# Patient Record
Sex: MALE | Race: WHITE | Employment: FULL TIME | URBAN - METROPOLITAN AREA
[De-identification: names, ages, dates, MRNs, and addresses within clinical notes are randomized per-mention and may not be internally consistent; named-entity substitution may affect disease eponyms.]

---

## 2017-01-03 ENCOUNTER — TELEPHONE (OUTPATIENT)
Dept: UROLOGY | Facility: CLINIC | Age: 52
End: 2017-01-03

## 2017-01-03 NOTE — TELEPHONE ENCOUNTER
Spoke with patient after having catheter removed on 1/2/17 post urethroplasty surgery 12/8/16. Patient stated he is urinating well and stream is good. Patient denies any fever,chills, or body aches. Patient states incision site is a little tender. Patient stated that VCUG appointment was supposed to be on 1/9/17, but stated he refused and wanted catheter removed. Message sent to Dana Kaufman RN so she can message Dr Gonzales about situation

## 2017-01-09 ENCOUNTER — OFFICE VISIT (OUTPATIENT)
Dept: UROLOGY | Facility: CLINIC | Age: 52
End: 2017-01-09

## 2017-01-09 VITALS
HEART RATE: 80 BPM | DIASTOLIC BLOOD PRESSURE: 78 MMHG | BODY MASS INDEX: 33.77 KG/M2 | WEIGHT: 228 LBS | HEIGHT: 69 IN | SYSTOLIC BLOOD PRESSURE: 144 MMHG

## 2017-01-09 DIAGNOSIS — N99.114 POSTPROCEDURAL URETHRAL STRICTURE, MALE, UNSPECIFIED: Primary | ICD-10-CM

## 2017-01-09 ASSESSMENT — PAIN SCALES - GENERAL: PAINLEVEL: NO PAIN (0)

## 2017-01-09 NOTE — Clinical Note
"2017       RE: Neto John  [address] [place] [address]     Dear Colleague,    Thank you for referring your patient, Neto John, to the [facility] UROLOGY AND INST FOR PROSTATE AND UROLOGIC CANCERS at [facility]. Please see a copy of my visit note below.    Urethroplasty post-operative visit:    Procedure: ventral onlay bulbar urethroplasty  Date: 16    HPI:  Neto John is a 51 year old male who is 4weeks status post urethroplasty. He had VCUG 1 week ago that showed resolution of stricture with no extravasation. He emptied his bladder well during the test.    Pain is controlled  Appetite is normal  Bowel movements are normal    /78 mmHg  Pulse 80  Ht 1.753 m (5' 9\")  Wt 103.42 kg (228 lb)  BMI 33.65 kg/m2  Incision clean, dry, intact  No tenderness or evidence of cellulitis  No hematoma  Sutures are intact    Imagin2017  RUG/VCU/11/16      A/P: 51 year old male 4 weeks s/p ventral onlay bulbar urethroplasty    Excellent outcome.    We discussed that thought his catheter was removed, he is still in the healing phase of surgery. He understands he should not lift heavy weights > 10 lbs for another 2 weeks. He is to avoid straddle activities (biking, motorcycle, horse riding, tractor work etc) for another 3 months and in general should avoid these activities. He understands should he require catheterization in the future, to make the performing provider aware of his urethral reconstruction. He expressed understanding.  F/U: 3 months for urethroscopy    _________________________________________________________________  Issac Gonzales, DO  Fellow/Instructor  Department of Urology                  "

## 2017-01-09 NOTE — NURSING NOTE
Chief Complaint   Patient presents with     Surgical Followup     urethroplasty      Jessee GALINDO

## 2017-01-09 NOTE — MR AVS SNAPSHOT
After Visit Summary   1/9/2017    Neto John    MRN: [id number]           Patient Information     Date Of Birth          1965        Visit Information        Provider Department      1/9/2017 12:30 PM Issac Gonzales DO [facility] Urology and [facility] for Prostate and Urologic Cancers        Care Instructions    Follow up with Dr Murillo on 3/27/17 at 730 am for a cystoscopy. Arrive to clinic with a full bladder for urine stream test    It was a pleasure meeting with you today.  Thank you for allowing me and my team the privilege of caring for you today.  YOU are the reason we are here, and I truly hope we provided you with the excellent service you deserve.  Please let us know if there is anything else we can do for you so that we can be sure you are leaving completely satisfied with your care experience.                Follow-ups after your visit        Your next 10 appointments already scheduled     Mar 27, 2017  7:30 AM   (Arrive by 7:15 AM)   Cystoscopy with Javier Murillo MD   [facility] Urology and [facility] for Prostate and Urologic Cancers ([facility])    [address] [place] [address] [phone]              Who to contact     Please call your clinic at [phone] to:    Ask questions about your health    Make or cancel appointments    Discuss your medicines    Learn about your test results    Speak to your doctor   If you have compliments or concerns about an experience at your clinic, or if you wish to file a complaint, please contact [facility] Patient Relations at [phone] or email us at [email or web address]         Additional Information About Your Visit        MyChart Information     [employer] is an electronic gateway that provides easy, online access to your medical records. With [employer], you can request a clinic appointment, read your test results, renew a prescription or  "communicate with your care team.     To sign up for [employer] visit the website at [email or web address]   You will be asked to enter the access code listed below, as well as some personal information. Please follow the directions to create your username and password.     Your access code is: [email or web address]  Expires: 3/8/2017  8:51 PM     Your access code will  in 90 days. If you need help or a new code, please contact your [facility] or call [phone] for assistance.        Care EveryWhere ID     This is your Care EveryWhere ID. This could be used by other organizations to access your [place] medical records  [id number]        Your Vitals Were     Pulse Height BMI (Body Mass Index)             80 1.753 m (5' 9\") 33.65 kg/m2          Blood Pressure from Last 3 Encounters:   17 144/78   16 120/77   16 146/85    Weight from Last 3 Encounters:   17 103.42 kg (228 lb)   16 102.1 kg (225 lb 1.4 oz)   16 97.523 kg (215 lb)              Today, you had the following     No orders found for display       Primary Care Provider Office Phone # Fax #    Sol LemonOG milian [phone]       [facility] [address] [facility] [address]        Thank you!     Thank you for choosing [facility] UROLOGY AND [facility] FOR PROSTATE AND UROLOGIC CANCERS  for your care. Our goal is always to provide you with excellent care. Hearing back from our patients is one way we can continue to improve our services. Please take a few minutes to complete the written survey that you may receive in the mail after your visit with us. Thank you!             Your Updated Medication List - Protect others around you: Learn how to safely use, store and throw away your medicines at www.disposemymeds.org.          This list is accurate as of: 17  1:06 PM.  Always use your most recent med list.                   Brand Name Dispense Instructions for use    " bacitracin ointment     14 g    Apply topically 2 times daily       OMEPRAZOLE PO      Take 20 mg by mouth       oxybutynin 10 MG 24 hr tablet    DITROPAN XL    90 tablet    Take 1 tablet (10 mg) by mouth daily       oxyCODONE 5 MG IR tablet    ROXICODONE    30 tablet    Take 1-2 tablets (5-10 mg) by mouth every 3 hours as needed for other (Moderate to Severe Pain)       senna 8.6 MG tablet    SENOKOT    120 tablet    Take 1 tablet by mouth daily       TYLENOL PO      Take 500 mg by mouth       VITAMIN D3 PO      Take 5,000 Units by mouth daily

## 2017-01-09 NOTE — PATIENT INSTRUCTIONS
Follow up with Dr Murillo on 3/27/17 at 730 am for a cystoscopy. Arrive to clinic with a full bladder for urine stream test    It was a pleasure meeting with you today.  Thank you for allowing me and my team the privilege of caring for you today.  YOU are the reason we are here, and I truly hope we provided you with the excellent service you deserve.  Please let us know if there is anything else we can do for you so that we can be sure you are leaving completely satisfied with your care experience.

## 2017-01-09 NOTE — PROGRESS NOTES
"Urethroplasty post-operative visit:    Procedure: ventral onlay bulbar urethroplasty  Date: 16    HPI:  Neto John is a 51 year old male who is 4weeks status post urethroplasty. He had VCUG 1 week ago that showed resolution of stricture with no extravasation. He emptied his bladder well during the test.    Pain is controlled  Appetite is normal  Bowel movements are normal    /78 mmHg  Pulse 80  Ht 1.753 m (5' 9\")  Wt 103.42 kg (228 lb)  BMI 33.65 kg/m2  Incision clean, dry, intact  No tenderness or evidence of cellulitis  No hematoma  Sutures are intact    Imagin2017  RUG/VCU/11/16      A/P: 51 year old male 4 weeks s/p ventral onlay bulbar urethroplasty    Excellent outcome.    We discussed that thought his catheter was removed, he is still in the healing phase of surgery. He understands he should not lift heavy weights > 10 lbs for another 2 weeks. He is to avoid straddle activities (biking, motorcycle, horse riding, tractor work etc) for another 3 months and in general should avoid these activities. He understands should he require catheterization in the future, to make the performing provider aware of his urethral reconstruction. He expressed understanding.  F/U: 3 months for urethroscopy    _________________________________________________________________  Issac Gonzales DO  Fellow/Instructor  Department of Urology            "

## 2017-03-23 ENCOUNTER — PRE VISIT (OUTPATIENT)
Dept: UROLOGY | Facility: CLINIC | Age: 52
End: 2017-03-23

## 2017-03-23 NOTE — TELEPHONE ENCOUNTER
Patient with a history of urethroplasty coming in for a cystoscopy. Chart reviewed and all records are available. Pt called and reminded to come with a full bladder for a flow/pvr.

## 2017-03-27 ENCOUNTER — OFFICE VISIT (OUTPATIENT)
Dept: UROLOGY | Facility: CLINIC | Age: 52
End: 2017-03-27

## 2017-03-27 VITALS — WEIGHT: 229.6 LBS | HEIGHT: 69 IN | BODY MASS INDEX: 34 KG/M2

## 2017-03-27 DIAGNOSIS — N99.111 POSTPROCEDURAL BULBOUS URETHRAL STRICTURE: Primary | ICD-10-CM

## 2017-03-27 ASSESSMENT — PAIN SCALES - GENERAL
PAINLEVEL: NO PAIN (0)
PAINLEVEL: NO PAIN (0)

## 2017-03-27 NOTE — MR AVS SNAPSHOT
After Visit Summary   3/27/2017    Neto John    MRN: [id number]           Patient Information     Date Of Birth          1965        Visit Information        Provider Department      3/27/2017 7:30 AM Javier Murillo MD [facility] Urology and [facility] for Prostate and Urologic Cancers        Today's Diagnoses     Postprocedural bulbous urethral stricture    -  1      Care Instructions    Cysto, Uroflow/PVR, Questionnaires in 9 months.        Follow-ups after your visit        Follow-up notes from your care team     Return in about 9 months (around 12/27/2017) for Cysto, Uroflow/PVR, Questionnaires.      Your next 10 appointments already scheduled     Dec 04, 2017  7:30 AM CST   (Arrive by 7:15 AM)   Cystoscopy with Javier Murillo MD   [facility] Urology and [facility] for Prostate and Urologic Cancers ([facility])    [address] [place] [address] [phone]              Who to contact     Please call your clinic at [phone] to:    Ask questions about your health    Make or cancel appointments    Discuss your medicines    Learn about your test results    Speak to your doctor   If you have compliments or concerns about an experience at your clinic, or if you wish to file a complaint, please contact [facility] Patient Relations at [phone] or email us at [email or web address]         Additional Information About Your Visit        MyChart Information     [employer] is an electronic gateway that provides easy, online access to your medical records. With [employer], you can request a clinic appointment, read your test results, renew a prescription or communicate with your care team.     To sign up for [employer] visit the website at [email or web address]   You will be asked to enter the access code listed below, as well as some personal information. Please follow the directions to create your username  "and password.     Your access code is: [email or web address]  Expires: 2017  6:30 AM     Your access code will  in 90 days. If you need help or a new code, please contact your [facility] or call [phone] for assistance.        Care EveryWhere ID     This is your Care EveryWhere ID. This could be used by other organizations to access your [place] medical records  [id number]        Your Vitals Were     Height BMI (Body Mass Index)                1.753 m (5' 9\") 33.91 kg/m2           Blood Pressure from Last 3 Encounters:   17 (P) 145/82   17 144/78   16 120/77    Weight from Last 3 Encounters:   17 104.1 kg (229 lb 9.6 oz)   17 103.4 kg (228 lb)   16 102.1 kg (225 lb 1.4 oz)              We Performed the Following     COMPLEX UROFLOWMETRY     Cystoscopy ([address])     POST-VOID RESIDUAL BLADDER SCAN          Today's Medication Changes          These changes are accurate as of: 3/27/17  8:41 AM.  If you have any questions, ask your nurse or doctor.               Stop taking these medicines if you haven't already. Please contact your care team if you have questions.     bacitracin ointment   Stopped by:  Javier Murillo MD           OMEPRAZOLE PO   Stopped by:  Javier Murillo MD           oxybutynin 10 MG 24 hr tablet   Commonly known as:  DITROPAN XL   Stopped by:  Javier Murillo MD           oxyCODONE 5 MG IR tablet   Commonly known as:  ROXICODONE   Stopped by:  Javier Murillo MD           senna 8.6 MG tablet   Commonly known as:  SENOKOT   Stopped by:  Javier Murillo MD                    Primary Care Provider Office Phone # Fax #    Sol Childs [phone]       [facility] [address] [facility] [address]        Thank you!     Thank you for choosing [facility] UROLOGY AND [facility] FOR PROSTATE AND UROLOGIC CANCERS  for your care. Our goal is always to provide you with " excellent care. Hearing back from our patients is one way we can continue to improve our services. Please take a few minutes to complete the written survey that you may receive in the mail after your visit with us. Thank you!             Your Updated Medication List - Protect others around you: Learn how to safely use, store and throw away your medicines at www.disposemymeds.org.          This list is accurate as of: 3/27/17  8:41 AM.  Always use your most recent med list.                   Brand Name Dispense Instructions for use    aspirin 81 MG tablet      Take 81 mg by mouth daily       TYLENOL PO      Take 500 mg by mouth       VITAMIN D3 PO      Take 5,000 Units by mouth daily

## 2017-03-27 NOTE — NURSING NOTE
"Chief Complaint   Patient presents with     Cystoscopy     Urethroplasty follow up       Height 1.753 m (5' 9\"), weight 104.1 kg (229 lb 9.6 oz). Body mass index is 33.91 kg/(m^2).    There is no problem list on file for this patient.      No Known Allergies    Current Outpatient Prescriptions   Medication Sig Dispense Refill     aspirin 81 MG tablet Take 81 mg by mouth daily       Cholecalciferol (VITAMIN D3 PO) Take 5,000 Units by mouth daily       Acetaminophen (TYLENOL PO) Take 500 mg by mouth         Social History   Substance Use Topics     Smoking status: Current Every Day Smoker     Smokeless tobacco: Not on file     Alcohol use 0.0 oz/week     0 Standard drinks or equivalent per week       JOSIE Lea  3/27/2017  8:15 AM       "

## 2017-03-27 NOTE — PROGRESS NOTES
Urethroplasty Follow-up Visit with Surveillance Urethroscopy    PRE-PROCEDURE DIAGNOSIS: History of urethral stricture  POST-PROCEDURE DIAGNOSIS: asymptomatic recurrence of urethral stricture   PROCEDURE: Urethroscopy    HISTORY: Neto John is a 51 year old man 3 months status-post buccal graft ventral onlay urethroplasty for urethral stricture.     Pain in the perineum is minimal.   Numbness in the perineum is absent.     Questionnaires reviewed. See flowsheet for details.    REVIEW OF OFFICE STUDIES:  Urinary Flow Rate  Peak Flow: 28.9 mL/s  Average Flow: 19.2 mL/s  Voided (mL): 359 mL  Residual Volume by Ultrasound: 109 mL  Character of Curve: Bell Shape     DESCRIPTION OF PROCEDURE:  After informed consent was obtained, the patient was brought to the procedure room where he was placed in the supine position with all pressure points well padded.  He was prepped and draped in a sterile fashion. A flexible cystoscope was introduced through a well-lubricated urethra.  The anterior urethra up to the point of reconstruction was normal in appearance. At the site of reconstruction there was evidence of stricture recurrence. The caliber of the urethra at the reconstruction was estimated to be 15 F throughout its length. The flexible cystoscope did not pass. The voluntary sphincter was identified and the scope withdrawn.    ASSESSMENT AND PLAN:  Asymptomatic anatomic recurrence after urethroplasty. The patient will follow-up in 9 months with with uroflowmetry, post-void residual urine volume measurement, Urethroplasty PROM, ADRIAN, MSHQ, and CLSS and post-op questionnaires. Cystoscopy will be needed. If symptoms recur cystoscopy will be done sooner.

## 2017-03-27 NOTE — LETTER
3/27/2017       RE: Neto John  [address] [facility] [address]     Dear Colleague,    Thank you for referring your patient, Neto John, to the [facility] UROLOGY AND INST FOR PROSTATE AND UROLOGIC CANCERS at [facility]. Please see a copy of my visit note below.    Urethroplasty Follow-up Visit with Surveillance Urethroscopy    PRE-PROCEDURE DIAGNOSIS: History of urethral stricture  POST-PROCEDURE DIAGNOSIS: asymptomatic recurrence of urethral stricture   PROCEDURE: Urethroscopy    HISTORY: Neto John is a 51 year old man 3 months status-post buccal graft ventral onlay urethroplasty for urethral stricture.     Pain in the perineum is minimal.   Numbness in the perineum is absent.     Questionnaires reviewed. See flowsheet for details.    REVIEW OF OFFICE STUDIES:  Urinary Flow Rate  Peak Flow: 28.9 mL/s  Average Flow: 19.2 mL/s  Voided (mL): 359 mL  Residual Volume by Ultrasound: 109 mL  Character of Curve: Bell Shape     DESCRIPTION OF PROCEDURE:  After informed consent was obtained, the patient was brought to the procedure room where he was placed in the supine position with all pressure points well padded.  He was prepped and draped in a sterile fashion. A flexible cystoscope was introduced through a well-lubricated urethra.  The anterior urethra up to the point of reconstruction was normal in appearance. At the site of reconstruction there was evidence of stricture recurrence. The caliber of the urethra at the reconstruction was estimated to be 15 F throughout its length. The flexible cystoscope did not pass. The voluntary sphincter was identified and the scope withdrawn.    ASSESSMENT AND PLAN:  Asymptomatic anatomic recurrence after urethroplasty. The patient will follow-up in 9 months with with uroflowmetry, post-void residual urine volume measurement, Urethroplasty PROM, ADRIAN, MSHQ, and CLSS and post-op questionnaires. Cystoscopy will be needed. If symptoms recur  cystoscopy will be done sooner.      Again, thank you for allowing me to participate in the care of your patient.      Sincerely,    Javier Murillo MD

## 2017-03-27 NOTE — NURSING NOTE
Invasive Procedure Safety Checklist:    Procedure:     Action: Complete sections and checkboxes as appropriate.    Pre-procedure:  1. Patient ID Verified with 2 identifiers (Ariella and  or MRN) : YES    2. Procedure and site verified with patient/designee (when able) : YES    3. Accurate consent documentation in medical record : YES    4. H&P (or appropriate assessment) documented in medical record : YES  H&P must be up to 30 days prior to procedure an updated within 24 hours of                 Procedure as applicable.     5. Relevant diagnostic and radiology test results appropriately labeled and displayed as applicable : YES    6. Blood products, implants, devices, and/or special equipment available for the procedure as applicable : YES    7. Procedure site(s) marked with provider initials [Exclusions: None] : NO    8. Marking not required. Reason : Yes  Procedure does not require site marking    Time Out:     Time-Out performed immediately prior to starting procedure, including verbal and active participation of all team members addressing: YES    1. Correct patient identity.  2. Confirmed that the correct side and site are marked.  3. An accurate procedure to be done.  4. Agreement on the procedure to be done.  5. Correct patient position.  6. Relevant images and results are properly labeled and appropriately displayed.  7. The need to administer antibiotics or fluids for irrigation purposes during the procedure as applicable.  8. Safety precautions based on patient history or medication use.    During Procedure: Verification of correct person, site, and procedure occurs any time the responsibility for care of the patient is transferred to another member of the care team.

## 2017-11-22 ENCOUNTER — PRE VISIT (OUTPATIENT)
Dept: UROLOGY | Facility: CLINIC | Age: 52
End: 2017-11-22

## 2017-11-22 NOTE — TELEPHONE ENCOUNTER
Pt coming in for a cystoscopy for a history of urethroplasty. All records available. Generic message left reminding Pt to come with a full bladder; he will need a flow/pvr.

## 2017-12-04 ENCOUNTER — OFFICE VISIT (OUTPATIENT)
Dept: UROLOGY | Facility: CLINIC | Age: 52
End: 2017-12-04

## 2017-12-04 VITALS
SYSTOLIC BLOOD PRESSURE: 141 MMHG | DIASTOLIC BLOOD PRESSURE: 78 MMHG | HEART RATE: 80 BPM | BODY MASS INDEX: 35.12 KG/M2 | WEIGHT: 237.1 LBS | HEIGHT: 69 IN

## 2017-12-04 DIAGNOSIS — Z87.448 HISTORY OF URETHRAL STRICTURE: Primary | ICD-10-CM

## 2017-12-04 ASSESSMENT — PAIN SCALES - GENERAL
PAINLEVEL: NO PAIN (0)
PAINLEVEL: NO PAIN (0)

## 2017-12-04 NOTE — LETTER
"12/4/2017       RE: Neto John  [address] [place] [address]       Dear Colleague,    Thank you for the opportunity to participate in the care of your patient, Neto John, at the [facility] UROLOGY AND INST FOR PROSTATE AND UROLOGIC CANCERS at [facility]. Please see a copy of my visit note below.    Urethroplasty Follow-up Visit with Surveillance Urethroscopy    PRE-PROCEDURE DIAGNOSIS: History of urethral stricture  POST-PROCEDURE DIAGNOSIS: No evidence of urethral stricture   PROCEDURE: Urethroscopy    HISTORY: Neto John is a 52 year old man 12 months status-post buccal graft ventral onlay urethroplasty for urethral stricture.     BMG complaints: Yes; please explain: mild tightness  Positioning complaints: No  Perineal / Genital complaints: No  Urinary incontinence: No      Questionnaires reviewed. See flowsheet for details.    REVIEW OF OFFICE STUDIES:         Qm 18.8  Qa 14.3  V 193  Shape: Bell  PVR 0      DESCRIPTION OF PROCEDURE:  After informed consent was obtained, the patient was brought to the procedure room where he was placed in the supine position with all pressure points well padded.  He was prepped and draped in a sterile fashion. A flexible cystoscope was introduced through a well-lubricated urethra.  The anterior urethra up to the point of reconstruction was diffusely somewhat narrowed. At the site of reconstruction there was evidence of stricture recurrence. The narrowest caliber of the urethra at the reconstruction was estimated to be 18F and this was located in the distal anastomosis. The flexible cystoscope passed with some maneuvering of the scope and a slight \"pop\" but on the way out there was no mucosal tear so I would call this an 18F narrowing. The voluntary sphincter was identified and the scope withdrawn.    ASSESSMENT AND PLAN:  Excellent outcome after urethroplasty. The patient will follow-up in 12 months with with uroflowmetry, post-void " residual urine volume measurement, Urethroplasty PROM, ADRIAN, MSHQ, and CLSS and post-op questionnaires. Cystoscopy will be needed. If symptoms recur cystoscopy will be done sooner.      Please do not hesitate to contact me if you have any questions/concerns.     Sincerely,     Javier Murillo MD

## 2017-12-04 NOTE — PATIENT INSTRUCTIONS
"Follow up in one year with Dr. Murillo for a cystoscopy.    It was a pleasure meeting with you today.  Thank you for allowing me and my team the privilege of caring for you today.  YOU are the reason we are here, and I truly hope we provided you with the excellent service you deserve.  Please let us know if there is anything else we can do for you so that we can be sure you are leaving completely satisfied with your care experience.        AFTER YOUR CYSTOSCOPY        You have just completed a cystoscopy, or \"cysto\", which allowed your physician to learn more about your bladder (or to remove a stent placed after surgery). We suggest that you continue to avoid caffeine, fruit juice, and alcohol for the next 24 hours, however, you are encouraged to return to your normal activities.         A few things that are considered normal after your cystoscopy:     * Small amount of bleeding (or spotting) that clears within the next 24 hours     * Slight burning sensation with urination     * Sensation to of needing to avoid more frequently     * The feeling of \"air\" in your urine     * Mild discomfort that is relieved with Tylenol        Please contact our office promptly if you:     * Develop a fever above 101 degrees     * Are unable to urinate     * Develop bright red blood that does not stop     * Severe pain or swelling         Please contact our office with any concerns or questions @[email or web address].  "

## 2017-12-04 NOTE — LETTER
"12/4/2017       RE: Neto John  [address] [facility] [address]     Dear Colleague,    Thank you for referring your patient, Neto John, to the [facility] UROLOGY AND INST FOR PROSTATE AND UROLOGIC CANCERS at [facility]. Please see a copy of my visit note below.    Urethroplasty Follow-up Visit with Surveillance Urethroscopy    PRE-PROCEDURE DIAGNOSIS: History of urethral stricture  POST-PROCEDURE DIAGNOSIS: No evidence of urethral stricture   PROCEDURE: Urethroscopy    HISTORY: Neto John is a 52 year old man 12 months status-post buccal graft ventral onlay urethroplasty for urethral stricture.     BMG complaints: Yes; please explain: mild tightness  Positioning complaints: No  Perineal / Genital complaints: No  Urinary incontinence: No      Questionnaires reviewed. See flowsheet for details.    REVIEW OF OFFICE STUDIES:         Qm 18.8  Qa 14.3  V 193  Shape: Bell  PVR 0      DESCRIPTION OF PROCEDURE:  After informed consent was obtained, the patient was brought to the procedure room where he was placed in the supine position with all pressure points well padded.  He was prepped and draped in a sterile fashion. A flexible cystoscope was introduced through a well-lubricated urethra.  The anterior urethra up to the point of reconstruction was diffusely somewhat narrowed. At the site of reconstruction there was evidence of stricture recurrence. The narrowest caliber of the urethra at the reconstruction was estimated to be 18F and this was located in the distal anastomosis. The flexible cystoscope passed with some maneuvering of the scope and a slight \"pop\" but on the way out there was no mucosal tear so I would call this an 18F narrowing. The voluntary sphincter was identified and the scope withdrawn.    ASSESSMENT AND PLAN:  Excellent outcome after urethroplasty. The patient will follow-up in 12 months with with uroflowmetry, post-void residual urine volume measurement, " Urethroplasty PROM, ADRIAN, MSHQ, and CLSS and post-op questionnaires. Cystoscopy will be needed. If symptoms recur cystoscopy will be done sooner.      Again, thank you for allowing me to participate in the care of your patient.      Sincerely,    Javier Murillo MD

## 2017-12-04 NOTE — NURSING NOTE
Invasive Procedure Safety Checklist:    Procedure:     Action: Complete sections and checkboxes as appropriate.    Pre-procedure:  1. Patient ID Verified with 2 identifiers (Ariella and  or MRN) : YES    2. Procedure and site verified with patient/designee (when able) : YES    3. Accurate consent documentation in medical record : YES    4. H&P (or appropriate assessment) documented in medical record : YES  H&P must be up to 30 days prior to procedure an updated within 24 hours of                 Procedure as applicable.     5. Relevant diagnostic and radiology test results appropriately labeled and displayed as applicable : YES    6. Blood products, implants, devices, and/or special equipment available for the procedure as applicable : YES    7. Procedure site(s) marked with provider initials [Exclusions: N/A] : NO    8. Marking not required. Reason : Yes  Procedure does not require site marking    Time Out:     Time-Out performed immediately prior to starting procedure, including verbal and active participation of all team members addressing: YES      1. Correct patient identity.  2. Confirmed that the correct side and site are marked.  3. An accurate procedure to be done.  4. Agreement on the procedure to be done.  5. Correct patient position.  6. Relevant images and results are properly labeled and appropriately displayed.  7. The need to administer antibiotics or fluids for irrigation purposes during the procedure as applicable.  8. Safety precautions based on patient history or medication use.    During Procedure: Verification of correct person, site, and procedure occurs any time the responsibility for care of the patient is transferred to another member of the care team.

## 2017-12-04 NOTE — PROGRESS NOTES
"Urethroplasty Follow-up Visit with Surveillance Urethroscopy    PRE-PROCEDURE DIAGNOSIS: History of urethral stricture  POST-PROCEDURE DIAGNOSIS: No evidence of urethral stricture   PROCEDURE: Urethroscopy    HISTORY: Neto John is a 52 year old man 12 months status-post buccal graft ventral onlay urethroplasty for urethral stricture.     BMG complaints: Yes; please explain: mild tightness  Positioning complaints: No  Perineal / Genital complaints: No  Urinary incontinence: No      Questionnaires reviewed. See flowsheet for details.    REVIEW OF OFFICE STUDIES:         Qm 18.8  Qa 14.3  V 193  Shape: Bell  PVR 0      DESCRIPTION OF PROCEDURE:  After informed consent was obtained, the patient was brought to the procedure room where he was placed in the supine position with all pressure points well padded.  He was prepped and draped in a sterile fashion. A flexible cystoscope was introduced through a well-lubricated urethra.  The anterior urethra up to the point of reconstruction was diffusely somewhat narrowed. At the site of reconstruction there was evidence of stricture recurrence. The narrowest caliber of the urethra at the reconstruction was estimated to be 18F and this was located in the distal anastomosis. The flexible cystoscope passed with some maneuvering of the scope and a slight \"pop\" but on the way out there was no mucosal tear so I would call this an 18F narrowing. The voluntary sphincter was identified and the scope withdrawn.    ASSESSMENT AND PLAN:  Excellent outcome after urethroplasty. The patient will follow-up in 12 months with with uroflowmetry, post-void residual urine volume measurement, Urethroplasty PROM, ADRIAN, MSHQ, and CLSS and post-op questionnaires. Cystoscopy will be needed. If symptoms recur cystoscopy will be done sooner.    "

## 2017-12-04 NOTE — NURSING NOTE
Chief Complaint   Patient presents with     Cystoscopy     Pt coming in for a cystoscopy for a history of urethroplasty.     Teresa Mosqueda

## 2017-12-04 NOTE — MR AVS SNAPSHOT
"              After Visit Summary   12/4/2017    Neto John    MRN: [id number]           Patient Information     Date Of Birth          1965        Visit Information        Provider Department      12/4/2017 7:30 AM Javier Murillo MD [facility] Urology and [facility] for Prostate and Urologic Cancers        Today's Diagnoses     History of urethral stricture    -  1      Care Instructions      AFTER YOUR CYSTOSCOPY        You have just completed a cystoscopy, or \"cysto\", which allowed your physician to learn more about your bladder (or to remove a stent placed after surgery). We suggest that you continue to avoid caffeine, fruit juice, and alcohol for the next 24 hours, however, you are encouraged to return to your normal activities.         A few things that are considered normal after your cystoscopy:     * Small amount of bleeding (or spotting) that clears within the next 24 hours     * Slight burning sensation with urination     * Sensation to of needing to avoid more frequently     * The feeling of \"air\" in your urine     * Mild discomfort that is relieved with Tylenol        Please contact our office promptly if you:     * Develop a fever above 101 degrees     * Are unable to urinate     * Develop bright red blood that does not stop     * Severe pain or swelling         Please contact our office with any concerns or questions @[facility].          Follow-ups after your visit        Follow-up notes from your care team     Return in 12 months (on 12/4/2018).      Who to contact     Please call your clinic at [phone] to:    Ask questions about your health    Make or cancel appointments    Discuss your medicines    Learn about your test results    Speak to your doctor   If you have compliments or concerns about an experience at your clinic, or if you wish to file a complaint, please contact [facility] Patient Relations at [phone] or email us at " [email or web address]         Additional Information About Your Visit        [employer] Information     [employer] is an electronic gateway that provides easy, online access to your medical records. With [employer], you can request a clinic appointment, read your test results, renew a prescription or communicate with your care team.     To sign up for [employer] visit the website at [email or web address]   You will be asked to enter the access code listed below, as well as some personal information. Please follow the directions to create your username and password.     Your access code is: [email or web address]  Expires: 2018  6:31 AM     Your access code will  in 90 days. If you need help or a new code, please contact your [facility] or call [phone] for assistance.        Care EveryWhere ID     This is your Care EveryWhere ID. This could be used by other organizations to access your [place] medical records  [id number]        Your Vitals Were     Pulse Height BMI (Body Mass Index)             80 1.753 m (5' 9\") 35.01 kg/m2          Blood Pressure from Last 3 Encounters:   17 141/78   17 (P) 145/82   17 144/78    Weight from Last 3 Encounters:   17 107.5 kg (237 lb 1.6 oz)   17 104.1 kg (229 lb 9.6 oz)   17 103.4 kg (228 lb)              We Performed the Following     CYSTOURETHROSCOPY        Primary Care Provider Office Phone # Fax #    Sol OG Childs [phone]       [facility] [address] [facility] [address]        Equal Access to Services     YESENIA ROMAN AH: Hadii celia Pepe, waricada luqadaha, qaybta kaalmada eugenio, mitch richardson. So [facility] [phone].    ATENCIÓN: Si habla español, tiene a robledo disposición servicios gratuitos de asistencia lingüística. Llame al [phone].    We comply with applicable federal civil rights laws and [place] laws. We do " not discriminate on the basis of race, color, national origin, age, disability, sex, sexual orientation, or gender identity.            Thank you!     Thank you for choosing [facility] UROLOGY AND [facility] FOR PROSTATE AND UROLOGIC CANCERS  for your care. Our goal is always to provide you with excellent care. Hearing back from our patients is one way we can continue to improve our services. Please take a few minutes to complete the written survey that you may receive in the mail after your visit with us. Thank you!             Your Updated Medication List - Protect others around you: Learn how to safely use, store and throw away your medicines at www.disposemymeds.org.          This list is accurate as of: 12/4/17  7:53 AM.  Always use your most recent med list.                   Brand Name Dispense Instructions for use Diagnosis    aspirin 81 MG tablet      Take 81 mg by mouth daily        TYLENOL PO      Take 500 mg by mouth        VITAMIN D3 PO      Take 5,000 Units by mouth daily           Yes, Core measure site...

## 2020-05-12 ENCOUNTER — TRANSFERRED RECORDS (OUTPATIENT)
Dept: HEALTH INFORMATION MANAGEMENT | Facility: CLINIC | Age: 55
End: 2020-05-12

## 2020-05-26 ENCOUNTER — DOCUMENTATION ONLY (OUTPATIENT)
Dept: CARE COORDINATION | Facility: CLINIC | Age: 55
End: 2020-05-26

## 2020-06-08 ENCOUNTER — OFFICE VISIT (OUTPATIENT)
Dept: UROLOGY | Facility: CLINIC | Age: 55
End: 2020-06-08
Payer: COMMERCIAL

## 2020-06-08 DIAGNOSIS — Z87.448 HISTORY OF URETHRAL STRICTURE: ICD-10-CM

## 2020-06-08 DIAGNOSIS — N99.111 POSTPROCEDURAL BULBOUS URETHRAL STRICTURE: Primary | ICD-10-CM

## 2020-06-08 RX ORDER — GABAPENTIN 300 MG/1
300 CAPSULE ORAL 3 TIMES DAILY
COMMUNITY

## 2020-06-08 NOTE — LETTER
6/8/2020       RE: Neto John  [address] [facility] [address]     Dear Colleague,    Thank you for referring your patient, Neto John, to the [facility] UROLOGY AND INST FOR PROSTATE AND UROLOGIC CANCERS at [facility]. Please see a copy of my visit note below.    Urethroplasty Follow-up Visit with Surveillance Urethroscopy    PRE-PROCEDURE DIAGNOSIS: History of urethral stricture  POST-PROCEDURE DIAGNOSIS: No evidence of urethral stricture   PROCEDURE: Urethroscopy    HISTORY: Neto John is a 55 year old man 4 years status-post buccal graft ventral onlay urethroplasty for urethral stricture.     BMG complaints: No  Positioning complaints: No  Perineal / Genital complaints: No  Urinary incontinence: No      Questionnaires reviewed. See flowsheet for details.    REVIEW OF OFFICE STUDIES:  Urinary Flow Rate  Peak Flow: 14.4 mL/s  Average Flow: 10.3 mL/s  Voided (mL): 157 mL     DESCRIPTION OF PROCEDURE:  After informed consent was obtained, the patient was brought to the procedure room where he was placed in the supine position with all pressure points well padded.  He was prepped and draped in a sterile fashion. A flexible cystoscope was introduced through a well-lubricated urethra.  The anterior urethra up to the point of reconstruction was diffusely somewhat narrowed.   At the site of reconstruction there was not evidence of stricture recurrence. The narrowest caliber of the urethra at the reconstruction was estimated to be 17F and this was located in mid to distal aspect of the repair. The flexible cystoscope did not pass. The voluntary sphincter was identified and the scope withdrawn.    ASSESSMENT AND PLAN:  Excellent outcome after urethroplasty. The patient will follow-up prn. He has a minor asymptomatic narrowing.     Again, thank you for allowing me to participate in the care of your patient.      Sincerely,    Javier Murillo MD

## 2020-06-08 NOTE — PROGRESS NOTES
Urethroplasty Follow-up Visit with Surveillance Urethroscopy    PRE-PROCEDURE DIAGNOSIS: History of urethral stricture  POST-PROCEDURE DIAGNOSIS: No evidence of urethral stricture   PROCEDURE: Urethroscopy    HISTORY: Neto John is a 55 year old man 4 years status-post buccal graft ventral onlay urethroplasty for urethral stricture.     BMG complaints: No  Positioning complaints: No  Perineal / Genital complaints: No  Urinary incontinence: No      Questionnaires reviewed. See flowsheet for details.    REVIEW OF OFFICE STUDIES:  Urinary Flow Rate  Peak Flow: 14.4 mL/s  Average Flow: 10.3 mL/s  Voided (mL): 157 mL     DESCRIPTION OF PROCEDURE:  After informed consent was obtained, the patient was brought to the procedure room where he was placed in the supine position with all pressure points well padded.  He was prepped and draped in a sterile fashion. A flexible cystoscope was introduced through a well-lubricated urethra.  The anterior urethra up to the point of reconstruction was diffusely somewhat narrowed. At the site of reconstruction there was not evidence of stricture recurrence. The narrowest caliber of the urethra at the reconstruction was estimated to be 17F and this was located in mid to distal aspect of the repair. The flexible cystoscope did not pass. The voluntary sphincter was identified and the scope withdrawn.    ASSESSMENT AND PLAN:  Excellent outcome after urethroplasty. The patient will follow-up prn. He has a minor asymptomatic narrowing.